# Patient Record
Sex: MALE | Race: WHITE | NOT HISPANIC OR LATINO | URBAN - METROPOLITAN AREA
[De-identification: names, ages, dates, MRNs, and addresses within clinical notes are randomized per-mention and may not be internally consistent; named-entity substitution may affect disease eponyms.]

---

## 2018-09-06 VITALS
DIASTOLIC BLOOD PRESSURE: 69 MMHG | DIASTOLIC BLOOD PRESSURE: 67 MMHG | DIASTOLIC BLOOD PRESSURE: 68 MMHG | SYSTOLIC BLOOD PRESSURE: 141 MMHG | RESPIRATION RATE: 19 BRPM | SYSTOLIC BLOOD PRESSURE: 112 MMHG | HEART RATE: 74 BPM | HEART RATE: 70 BPM | DIASTOLIC BLOOD PRESSURE: 76 MMHG | SYSTOLIC BLOOD PRESSURE: 137 MMHG | DIASTOLIC BLOOD PRESSURE: 74 MMHG | SYSTOLIC BLOOD PRESSURE: 119 MMHG | HEART RATE: 72 BPM | OXYGEN SATURATION: 95 % | OXYGEN SATURATION: 92 % | OXYGEN SATURATION: 96 % | HEART RATE: 92 BPM | RESPIRATION RATE: 27 BRPM | RESPIRATION RATE: 23 BRPM | WEIGHT: 228 LBS | OXYGEN SATURATION: 97 % | RESPIRATION RATE: 18 BRPM | RESPIRATION RATE: 22 BRPM | RESPIRATION RATE: 14 BRPM | SYSTOLIC BLOOD PRESSURE: 106 MMHG | OXYGEN SATURATION: 93 % | HEART RATE: 60 BPM | DIASTOLIC BLOOD PRESSURE: 60 MMHG | TEMPERATURE: 97.3 F | SYSTOLIC BLOOD PRESSURE: 109 MMHG | DIASTOLIC BLOOD PRESSURE: 66 MMHG | SYSTOLIC BLOOD PRESSURE: 140 MMHG | TEMPERATURE: 97.5 F | HEIGHT: 70 IN | HEART RATE: 79 BPM | SYSTOLIC BLOOD PRESSURE: 118 MMHG

## 2018-09-06 PROBLEM — Z86.010 PERSONAL HISTORY OF COLONIC POLYPS: Status: ACTIVE | Noted: 2018-09-07

## 2018-09-07 ENCOUNTER — OFFICE (AMBULATORY)
Dept: URBAN - METROPOLITAN AREA PATHOLOGY 4 | Facility: PATHOLOGY | Age: 63
End: 2018-09-07
Payer: COMMERCIAL

## 2018-09-07 ENCOUNTER — AMBULATORY SURGICAL CENTER (AMBULATORY)
Dept: URBAN - METROPOLITAN AREA SURGERY 17 | Facility: SURGERY | Age: 63
End: 2018-09-07

## 2018-09-07 DIAGNOSIS — D12.3 BENIGN NEOPLASM OF TRANSVERSE COLON: ICD-10-CM

## 2018-09-07 DIAGNOSIS — Z86.010 PERSONAL HISTORY OF COLONIC POLYPS: ICD-10-CM

## 2018-09-07 DIAGNOSIS — K63.5 POLYP OF COLON: ICD-10-CM

## 2018-09-07 LAB
GI HISTOLOGY: A. UNSPECIFIED: (no result)
GI HISTOLOGY: PDF REPORT: (no result)

## 2018-09-07 PROCEDURE — 88305 TISSUE EXAM BY PATHOLOGIST: CPT | Mod: 33 | Performed by: INTERNAL MEDICINE

## 2018-09-07 PROCEDURE — 45385 COLONOSCOPY W/LESION REMOVAL: CPT | Mod: 33 | Performed by: INTERNAL MEDICINE

## 2024-05-07 ENCOUNTER — TRANSCRIBE ORDERS (OUTPATIENT)
Age: 69
End: 2024-05-07

## 2024-05-07 DIAGNOSIS — I73.9 PERIPHERAL VASCULAR DISEASE: Primary | ICD-10-CM

## 2024-05-07 DIAGNOSIS — E11.65 TYPE 2 DIABETES MELLITUS WITH HYPERGLYCEMIA, UNSPECIFIED WHETHER LONG TERM INSULIN USE: ICD-10-CM

## 2024-06-27 ENCOUNTER — OFFICE VISIT (OUTPATIENT)
Age: 69
End: 2024-06-27
Payer: MEDICARE

## 2024-06-27 VITALS
HEIGHT: 70 IN | DIASTOLIC BLOOD PRESSURE: 77 MMHG | WEIGHT: 213.85 LBS | SYSTOLIC BLOOD PRESSURE: 149 MMHG | HEART RATE: 75 BPM | BODY MASS INDEX: 30.61 KG/M2

## 2024-06-27 DIAGNOSIS — E13.51 PERIPHERAL VASCULAR DISEASE DUE TO SECONDARY DIABETES: Primary | ICD-10-CM

## 2024-06-27 DIAGNOSIS — G62.9 NEUROPATHY: ICD-10-CM

## 2024-06-27 RX ORDER — ERGOCALCIFEROL 1.25 MG/1
1 CAPSULE ORAL WEEKLY
COMMUNITY
Start: 2024-06-13

## 2024-06-27 RX ORDER — EMPAGLIFLOZIN 25 MG/1
25 TABLET, FILM COATED ORAL EVERY MORNING
COMMUNITY
Start: 2024-06-13

## 2024-06-27 RX ORDER — PIOGLITAZONEHYDROCHLORIDE 30 MG/1
30 TABLET ORAL EVERY MORNING
COMMUNITY
Start: 2024-05-29

## 2024-06-27 RX ORDER — LEVOTHYROXINE SODIUM 0.05 MG/1
50 TABLET ORAL DAILY
COMMUNITY
Start: 2024-05-29

## 2024-06-27 RX ORDER — EZETIMIBE 10 MG/1
1 TABLET ORAL DAILY
COMMUNITY
Start: 2024-05-12

## 2024-06-27 RX ORDER — ROSUVASTATIN CALCIUM 40 MG/1
1 TABLET, COATED ORAL DAILY
COMMUNITY
Start: 2024-06-13

## 2024-06-27 NOTE — PROGRESS NOTES
Patient Name: Jono Montanez    MRN: [id number] Encounter Date: 06/27/2024      Consulting Service: Vascular Surgery    Referring Provider: Giovanna Fowler MD       CHIEF COMPLAINT:  Chief Complaint   Patient presents with    Peripheral Vascular Disease       Subjective    HPI: Jono Montanez is a 68 y.o. male is being seen for evaluation/management of painful numb feet.  At times he has had to wake up at night and stretches legs in a dorsiflexion manner to get his legs to release and and improved.  He states he can walk a decent distance and does not get pain that causes him to hang his foot over the side of the bed at night.  The bottom of his feet have gotten numb.  He saw some years ago and was tested at that time but is worried that is symptoms have progressed.  He has had a friend in [place] to get intervention recently for peripheral vascular disease.    PAST MEDICAL HISTORY: History reviewed. No pertinent past medical history.   PAST SURGICAL HISTORY: History reviewed. No pertinent surgical history.   FAMILY HISTORY: History reviewed. No pertinent family history.   SOCIAL HISTORY:     MEDICATIONS:   Current Outpatient Medications on File Prior to Visit   Medication Sig Dispense Refill    ezetimibe (ZETIA) 10 MG tablet Take 1 tablet by mouth Daily.      Jardiance 25 MG tablet tablet Take 1 tablet by mouth Every Morning.      levothyroxine (SYNTHROID, LEVOTHROID) 50 MCG tablet Take 1 tablet by mouth Daily.      pioglitazone (ACTOS) 30 MG tablet Take 1 tablet by mouth Every Morning.      rosuvastatin (CRESTOR) 40 MG tablet Take 1 tablet by mouth Daily.      vitamin D (ERGOCALCIFEROL) 1.25 MG (06115 UT) capsule capsule Take 1 capsule by mouth 1 (One) Time Per Week.       No current facility-administered medications on file prior to visit.       ALLERGIES: Patient has no known allergies.       Objective   Vitals:    06/27/24 1051   BP: 149/77   Pulse: 75   Weight: 97 kg (213 lb 13.5 oz)   Height: 177.8  "cm (70\")     Body mass index is 30.68 kg/m².  BMI is >= 30 and <35. (Class 1 Obesity). The following options were offered after discussion;: weight loss educational material (shared in after visit summary), exercise counseling/recommendations, and Information on healthy weight added to patient's after visit summary.      PHYSICAL EXAM:   Physical Exam  Constitutional:       Appearance: Normal appearance. He is normal weight.   HENT:      Head: Normocephalic and atraumatic.      Nose: Nose normal.   Eyes:      Extraocular Movements: Extraocular movements intact.      Pupils: Pupils are equal, round, and reactive to light.   Cardiovascular:      Rate and Rhythm: Normal rate.      Pulses:           Carotid pulses are 2+ on the right side and 2+ on the left side.       Radial pulses are 2+ on the right side and 2+ on the left side.        Femoral pulses are 2+ on the right side and 2+ on the left side.       Popliteal pulses are 2+ on the right side and 2+ on the left side.        Dorsalis pedis pulses are 1+ on the right side and 1+ on the left side.        Posterior tibial pulses are 2+ on the right side and 2+ on the left side.      Heart sounds: Normal heart sounds.   Pulmonary:      Effort: Pulmonary effort is normal.      Breath sounds: Normal breath sounds.   Abdominal:      General: Abdomen is flat. Bowel sounds are normal.      Palpations: Abdomen is soft.   Musculoskeletal:         General: Normal range of motion.      Cervical back: Normal range of motion and neck supple.      Right lower leg: No edema.      Left lower leg: No edema.   Skin:     General: Skin is warm and dry.   Neurological:      General: No focal deficit present.      Mental Status: He is alert and oriented to person, place, and time. Mental status is at baseline.   Psychiatric:         Mood and Affect: Mood normal.         Thought Content: Thought content normal.          Result Review   LABS:      Results Review:       I reviewed the " patient's new clinical results.    The following radiologic or non-invasive studies have been reviewed by me: No studies available.  No radiology results for the last 30 days.                ASSESSMENT/PLAN:   Diagnoses and all orders for this visit:    1. Peripheral vascular disease due to secondary diabetes (Primary)  -     Doppler Arterial Lower Extremity Stress CAR; Future    2. Neuropathy  -     Doppler Arterial Lower Extremity Stress CAR; Future       68 y.o. male with possible peripheral vascular disease versus neuropathy.  It is concerning as far as his progression of symptoms.  I can still palpate +2 posterior tibial pulses but I think a lower extremity arterial testing with stress and digital pressures is key to us knowing exactly what his perfusion is doing.  Will get this test and go over the results with her directly and make a decision if there is anything that can or should be done.  The big concern is whether or not this is peripheral vascular disease or if this is another source of neuropathy such as his diabetes.  His diabetes is under reasonable control what his A1c is 8.5.  He does not have any back pain or complaints of problems that would lead me towards any sciatica.    I discussed the plan with the patient who is agreeable to the plan of care at this point. Thank you for this consult.   Follow Up  Return in about 6 weeks (around 8/8/2024).    Evgeny Blanton MD   06/27/24

## 2024-07-10 ENCOUNTER — HOSPITAL ENCOUNTER (OUTPATIENT)
Dept: CARDIOLOGY | Facility: HOSPITAL | Age: 69
Discharge: HOME | End: 2024-07-10
Admitting: SURGERY
Payer: MEDICARE

## 2024-07-10 DIAGNOSIS — E13.51 PERIPHERAL VASCULAR DISEASE DUE TO SECONDARY DIABETES: ICD-10-CM

## 2024-07-10 DIAGNOSIS — G62.9 NEUROPATHY: ICD-10-CM

## 2024-07-10 LAB
BH CV LOWER ARTERIAL LEFT ABI RATIO: 1.21
BH CV LOWER ARTERIAL LEFT DORSALIS PEDIS SYS MAX: 151
BH CV LOWER ARTERIAL LEFT GREAT TOE SYS MAX: 92
BH CV LOWER ARTERIAL LEFT POST EX ABI RATIO: 1.31
BH CV LOWER ARTERIAL LEFT POST TIBIAL SYS MAX: 170
BH CV LOWER ARTERIAL LEFT TBI RATIO: 0.65
BH CV LOWER ARTERIAL RIGHT ABI RATIO: 1.2
BH CV LOWER ARTERIAL RIGHT DORSALIS PEDIS SYS MAX: 150
BH CV LOWER ARTERIAL RIGHT GREAT TOE SYS MAX: 110
BH CV LOWER ARTERIAL RIGHT POST EX ABI RATIO: 1.28
BH CV LOWER ARTERIAL RIGHT POST TIBIAL SYS MAX: 169
BH CV LOWER ARTERIAL RIGHT TBI RATIO: 0.78
UPPER ARTERIAL LEFT ARM BRACHIAL SYS MAX: 141
UPPER ARTERIAL RIGHT ARM BRACHIAL SYS MAX: 134

## 2024-07-10 PROCEDURE — 93924 LWR XTR VASC STDY BILAT: CPT

## 2024-07-18 ENCOUNTER — OFFICE VISIT (OUTPATIENT)
Age: 69
End: 2024-07-18
Payer: MEDICARE

## 2024-07-18 VITALS
DIASTOLIC BLOOD PRESSURE: 79 MMHG | WEIGHT: 213 LBS | HEART RATE: 74 BPM | BODY MASS INDEX: 30.49 KG/M2 | SYSTOLIC BLOOD PRESSURE: 123 MMHG | HEIGHT: 70 IN

## 2024-07-18 DIAGNOSIS — G62.9 NEUROPATHY: ICD-10-CM

## 2024-07-18 DIAGNOSIS — E13.51 PERIPHERAL VASCULAR DISEASE DUE TO SECONDARY DIABETES: Primary | ICD-10-CM

## 2024-07-18 NOTE — PROGRESS NOTES
Patient Name: Jono Montanez    MRN: [id number] Encounter Date: 06/27/2024      Consulting Service: Vascular Surgery    Referring Provider: No ref. provider found       CHIEF COMPLAINT:  Chief Complaint   Patient presents with    Peripheral Vascular Disease       Subjective    HPI: Jono Montanez is a 68 y.o. male is being seen for evaluation/management of painful numb feet.  At times he has had to wake up at night and stretches legs in a dorsiflexion manner to get his legs to release and and improved.  He states he can walk a decent distance and does not get pain that causes him to hang his foot over the side of the bed at night.  The bottom of his feet have gotten numb.  He saw some years ago and was tested at that time but is worried that is symptoms have progressed.  He has had a friend in [place] to get intervention recently for peripheral vascular disease.    Today we are reviewing his lower extremity arterial testing which shows no drop in pressure with ambulation.  This is negative for claudication on either side.  His lower extremities have normal triphasic waveforms on the left and on the right with toe pressures being more than adequate to heal.  Essentially this looks to be a overall normal test    PAST MEDICAL HISTORY: History reviewed. No pertinent past medical history.   PAST SURGICAL HISTORY: History reviewed. No pertinent surgical history.   FAMILY HISTORY: History reviewed. No pertinent family history.   SOCIAL HISTORY:   Social History     Tobacco Use    Smoking status: Never      MEDICATIONS:   Current Outpatient Medications on File Prior to Visit   Medication Sig Dispense Refill    ezetimibe (ZETIA) 10 MG tablet Take 1 tablet by mouth Daily.      Jardiance 25 MG tablet tablet Take 1 tablet by mouth Every Morning.      levothyroxine (SYNTHROID, LEVOTHROID) 50 MCG tablet Take 1 tablet by mouth Daily.      pioglitazone (ACTOS) 30 MG tablet Take 1 tablet by mouth Every Morning.       "rosuvastatin (CRESTOR) 40 MG tablet Take 1 tablet by mouth Daily.      vitamin D (ERGOCALCIFEROL) 1.25 MG (95017 UT) capsule capsule Take 1 capsule by mouth 1 (One) Time Per Week.       No current facility-administered medications on file prior to visit.       ALLERGIES: Patient has no known allergies.       Objective   Vitals:    07/18/24 1144   BP: 123/79   Pulse: 74   Weight: 96.6 kg (213 lb)   Height: 177.8 cm (70\")     Body mass index is 30.56 kg/m².  BMI is >= 30 and <35. (Class 1 Obesity). The following options were offered after discussion;: weight loss educational material (shared in after visit summary), exercise counseling/recommendations, and Information on healthy weight added to patient's after visit summary.      PHYSICAL EXAM:   Physical Exam  Constitutional:       Appearance: Normal appearance. He is normal weight.   HENT:      Head: Normocephalic and atraumatic.      Nose: Nose normal.   Eyes:      Extraocular Movements: Extraocular movements intact.      Pupils: Pupils are equal, round, and reactive to light.   Cardiovascular:      Rate and Rhythm: Normal rate.      Pulses:           Carotid pulses are 2+ on the right side and 2+ on the left side.       Radial pulses are 2+ on the right side and 2+ on the left side.        Femoral pulses are 2+ on the right side and 2+ on the left side.       Popliteal pulses are 2+ on the right side and 2+ on the left side.        Dorsalis pedis pulses are 1+ on the right side and 1+ on the left side.        Posterior tibial pulses are 2+ on the right side and 2+ on the left side.      Heart sounds: Normal heart sounds.   Pulmonary:      Effort: Pulmonary effort is normal.      Breath sounds: Normal breath sounds.   Abdominal:      General: Abdomen is flat. Bowel sounds are normal.      Palpations: Abdomen is soft.   Musculoskeletal:         General: Normal range of motion.      Cervical back: Normal range of motion and neck supple.      Right lower leg: No " edema.      Left lower leg: No edema.   Skin:     General: Skin is warm and dry.   Neurological:      General: No focal deficit present.      Mental Status: He is alert and oriented to person, place, and time. Mental status is at baseline.   Psychiatric:         Mood and Affect: Mood normal.         Thought Content: Thought content normal.          Result Review   LABS:      Results Review:       I reviewed the patient's new clinical results.    The following radiologic or non-invasive studies have been reviewed by me: Arterial testing reviewed with images reviewed with the patient  No radiology results for the last 30 days.                ASSESSMENT/PLAN:   Diagnoses and all orders for this visit:    1. Peripheral vascular disease due to secondary diabetes (Primary)  -     Doppler Ankle Brachial Index Single Level CAR; Future    2. Neuropathy       68 y.o. male with possible peripheral vascular disease versus neuropathy.  It is concerning as far as his progression of symptoms.  At this point in time I believe the majority of symptoms are neuropathic in nature as his arterial testing is within normal limits.  We had a long discussion about protecting his feet especially as his neuropathy progresses towards numbness.  He is going to be aggressive with this and continue to wear his white socks and monitor his feet.  I am going to follow him up in 1 year to recheck his feet just for foot exam and make sure things remain stable on his diabetes.  Wish I had a way to fix his neuropathy but that is it is it is probably either idiopathic or related to his diabetes.  He is aware to keep his sugars under strict control and watch his feet.  We will recheck his NOEMI in a year  I discussed the plan with the patient who is agreeable to the plan of care at this point. Thank you for this consult.   Follow Up  Return in about 1 year (around 7/18/2025).    Evgeny Blanton MD   07/18/24

## 2025-02-03 DIAGNOSIS — E13.51 PERIPHERAL VASCULAR DISEASE DUE TO SECONDARY DIABETES: Primary | ICD-10-CM

## 2025-03-14 ENCOUNTER — TELEPHONE (OUTPATIENT)
Facility: HOSPITAL | Age: 70
End: 2025-03-14
Payer: MEDICARE

## 2025-03-14 NOTE — TELEPHONE ENCOUNTER
Called and left voicemail for patient to call our office back to schedule 1 YR follow up with NOEMI for July 2025

## 2025-04-08 ENCOUNTER — OFFICE VISIT (OUTPATIENT)
Age: 70
End: 2025-04-08
Payer: MEDICARE

## 2025-04-08 VITALS
DIASTOLIC BLOOD PRESSURE: 70 MMHG | BODY MASS INDEX: 30.64 KG/M2 | WEIGHT: 214 LBS | SYSTOLIC BLOOD PRESSURE: 130 MMHG | HEIGHT: 70 IN | HEART RATE: 78 BPM

## 2025-04-08 DIAGNOSIS — R06.09 EXERTIONAL DYSPNEA: Primary | ICD-10-CM

## 2025-04-08 PROCEDURE — 1160F RVW MEDS BY RX/DR IN RCRD: CPT | Performed by: INTERNAL MEDICINE

## 2025-04-08 PROCEDURE — 93000 ELECTROCARDIOGRAM COMPLETE: CPT | Performed by: INTERNAL MEDICINE

## 2025-04-08 PROCEDURE — 1159F MED LIST DOCD IN RCRD: CPT | Performed by: INTERNAL MEDICINE

## 2025-04-08 PROCEDURE — 99204 OFFICE O/P NEW MOD 45 MIN: CPT | Performed by: INTERNAL MEDICINE

## 2025-04-08 RX ORDER — NATEGLINIDE 120 MG/1
120 TABLET ORAL
COMMUNITY
Start: 2025-03-21

## 2025-04-08 RX ORDER — ASPIRIN 81 MG/1
81 TABLET ORAL DAILY
COMMUNITY

## 2025-04-08 NOTE — PROGRESS NOTES
Subjective:     Encounter Date:04/08/2025      Patient ID: Jono Montanez is a 69 y.o. male.    Chief Complaint: dyspnea  HPI:   This is a pleasant 69-year-old man with a history of hypertension diabetes hyperlipidemia.  He recently noticed reduced exercise tolerance.  He recently traveled to his home country of [place] and had a lot of dietary intolerances and was not taking his medications regularly.  His A1c went from well-controlled to about 10%.  He has felt quite sluggish she has had heaviness in the chest and some mild dyspnea not necessarily with his regular 3 mile walk but when he is rushing more briskly or jogging.  He has no real angina orthopnea or PND.  He has never been told he has a heart murmur.  No palpitations or dizziness.  Is been evaluated by pulmonology apparently with no findings.  Overall he feels quite fatigued and feels he could sleep and unlimited number of hours per day.  No known family history.  He is a retired .  He has an adult son.    The following portions of the patient's history were reviewed and updated as appropriate: allergies, current medications, past family history, past medical history, past social history, past surgical history and problem list.     REVIEW OF SYSTEMS:   All systems reviewed.  Pertinent positives identified in HPI.  All other systems are negative.    No past medical history on file.    Family History   Problem Relation Age of Onset    No Known Problems Mother     Cerebral aneurysm Father 79    Cerebral aneurysm Brother 26       Social History     Socioeconomic History    Marital status: Unknown   Tobacco Use    Smoking status: Never     Passive exposure: Never    Smokeless tobacco: Never   Vaping Use    Vaping status: Never Used   Substance and Sexual Activity    Alcohol use: Not Currently    Drug use: Defer    Sexual activity: Defer       No Known Allergies    No past surgical history on file.      ECG 12 Lead    Date/Time: 4/8/2025 4:36  PM  Performed by: Ella Herndon MD    Authorized by: Ella Herndon MD  Comparison: not compared with previous ECG   Rhythm: sinus rhythm  Rate: normal  T flattening: V6, V5, V4, II, III, aVF and aVL  QRS axis: normal  Other findings: non-specific ST-T wave changes    Clinical impression: abnormal EKG           Objective:         PHYSICAL EXAM:  GEN: VSS, no distress,   Eyes: normal sclera, normal lids and lashes  HENT: moist mucus membranes,   Respiratory: CTAB, no rales or wheezes  CV: RRR, 2 out of 6 radiating systolic murmur to the clavicles , , +2 DP and 2+ carotid pulses b/l  GI: NABS, soft,  Nontender, nondistended  MSK: no edema, no scoliosis or kyphosis  Skin: no rash, warm, dry  Heme/Lymph: no bruising or bleeding  Psych: organized thought, normal behavior and affect  Neuro: Cranial nerves grossly intact, Alert and Oriented x 3.         Assessment:          Diagnosis Plan   1. Exertional dyspnea  Adult Transthoracic Echo Complete w/ Color, Spectral and Contrast if Necessary Per Protocol    Stress Test With Myocardial Perfusion One Day             Plan:       1.  Exertional dyspnea: Plan nuclear stress test.  69-year-old man with hypertension, diabetes and hyperlipidemia.  Resting EKG was reportedly normal in the past however his EKG today shows nonspecific T wave flattening inferior and lateral leads as such a treadmill stress test will not be helpful.  2.  Murmur aortic sclerosis or mild AS: Plan echocardiogram    Dr. Ocampo thank you very much for referring this kind patient to me. Please call me with any questions or concerns. I will see the patient again in the office in 3 months.          Ella Herndon MD  04/08/25  [facility]    Outpatient Encounter Medications as of 4/8/2025   Medication Sig Dispense Refill    aspirin 81 MG EC tablet Take 1 tablet by mouth Daily.      ezetimibe (ZETIA) 10 MG tablet Take 1 tablet by mouth Daily.      Jardiance 25 MG tablet tablet Take 1 tablet by  mouth Every Morning.      levothyroxine (SYNTHROID, LEVOTHROID) 50 MCG tablet Take 1 tablet by mouth Daily.      nateglinide (STARLIX) 120 MG tablet Take 1 tablet by mouth 3 (Three) Times a Day Before Meals.      rosuvastatin (CRESTOR) 40 MG tablet Take 1 tablet by mouth Daily.      [DISCONTINUED] pioglitazone (ACTOS) 30 MG tablet Take 1 tablet by mouth Every Morning.      [DISCONTINUED] vitamin D (ERGOCALCIFEROL) 1.25 MG (32842 UT) capsule capsule Take 1 capsule by mouth 1 (One) Time Per Week.       No facility-administered encounter medications on file as of 4/8/2025.

## 2025-04-08 NOTE — H&P (VIEW-ONLY)
Subjective:     Encounter Date:04/08/2025      Patient ID: Jono Montanez is a 69 y.o. male.    Chief Complaint: dyspnea  HPI:   This is a pleasant 69-year-old man with a history of hypertension diabetes hyperlipidemia.  He recently noticed reduced exercise tolerance.  He recently traveled to his home country of [place] and had a lot of dietary intolerances and was not taking his medications regularly.  His A1c went from well-controlled to about 10%.  He has felt quite sluggish she has had heaviness in the chest and some mild dyspnea not necessarily with his regular 3 mile walk but when he is rushing more briskly or jogging.  He has no real angina orthopnea or PND.  He has never been told he has a heart murmur.  No palpitations or dizziness.  Is been evaluated by pulmonology apparently with no findings.  Overall he feels quite fatigued and feels he could sleep and unlimited number of hours per day.  No known family history.  He is a retired .  He has an adult son.    The following portions of the patient's history were reviewed and updated as appropriate: allergies, current medications, past family history, past medical history, past social history, past surgical history and problem list.     REVIEW OF SYSTEMS:   All systems reviewed.  Pertinent positives identified in HPI.  All other systems are negative.    No past medical history on file.    Family History   Problem Relation Age of Onset    No Known Problems Mother     Cerebral aneurysm Father 79    Cerebral aneurysm Brother 26       Social History     Socioeconomic History    Marital status: Unknown   Tobacco Use    Smoking status: Never     Passive exposure: Never    Smokeless tobacco: Never   Vaping Use    Vaping status: Never Used   Substance and Sexual Activity    Alcohol use: Not Currently    Drug use: Defer    Sexual activity: Defer       No Known Allergies    No past surgical history on file.      ECG 12 Lead    Date/Time: 4/8/2025 4:36  PM  Performed by: Ella Herndon MD    Authorized by: Ella Herndon MD  Comparison: not compared with previous ECG   Rhythm: sinus rhythm  Rate: normal  T flattening: V6, V5, V4, II, III, aVF and aVL  QRS axis: normal  Other findings: non-specific ST-T wave changes    Clinical impression: abnormal EKG           Objective:         PHYSICAL EXAM:  GEN: VSS, no distress,   Eyes: normal sclera, normal lids and lashes  HENT: moist mucus membranes,   Respiratory: CTAB, no rales or wheezes  CV: RRR, 2 out of 6 radiating systolic murmur to the clavicles , , +2 DP and 2+ carotid pulses b/l  GI: NABS, soft,  Nontender, nondistended  MSK: no edema, no scoliosis or kyphosis  Skin: no rash, warm, dry  Heme/Lymph: no bruising or bleeding  Psych: organized thought, normal behavior and affect  Neuro: Cranial nerves grossly intact, Alert and Oriented x 3.         Assessment:          Diagnosis Plan   1. Exertional dyspnea  Adult Transthoracic Echo Complete w/ Color, Spectral and Contrast if Necessary Per Protocol    Stress Test With Myocardial Perfusion One Day             Plan:       1.  Exertional dyspnea: Plan nuclear stress test.  69-year-old man with hypertension, diabetes and hyperlipidemia.  Resting EKG was reportedly normal in the past however his EKG today shows nonspecific T wave flattening inferior and lateral leads as such a treadmill stress test will not be helpful.  2.  Murmur aortic sclerosis or mild AS: Plan echocardiogram    Dr. Ocampo thank you very much for referring this kind patient to me. Please call me with any questions or concerns. I will see the patient again in the office in 3 months.          Ella Herndon MD  04/08/25  [facility]    Outpatient Encounter Medications as of 4/8/2025   Medication Sig Dispense Refill    aspirin 81 MG EC tablet Take 1 tablet by mouth Daily.      ezetimibe (ZETIA) 10 MG tablet Take 1 tablet by mouth Daily.      Jardiance 25 MG tablet tablet Take 1 tablet by  mouth Every Morning.      levothyroxine (SYNTHROID, LEVOTHROID) 50 MCG tablet Take 1 tablet by mouth Daily.      nateglinide (STARLIX) 120 MG tablet Take 1 tablet by mouth 3 (Three) Times a Day Before Meals.      rosuvastatin (CRESTOR) 40 MG tablet Take 1 tablet by mouth Daily.      [DISCONTINUED] pioglitazone (ACTOS) 30 MG tablet Take 1 tablet by mouth Every Morning.      [DISCONTINUED] vitamin D (ERGOCALCIFEROL) 1.25 MG (31081 UT) capsule capsule Take 1 capsule by mouth 1 (One) Time Per Week.       No facility-administered encounter medications on file as of 4/8/2025.

## 2025-04-23 ENCOUNTER — TELEPHONE (OUTPATIENT)
Dept: CARDIOLOGY | Age: 70
End: 2025-04-23
Payer: MEDICARE

## 2025-04-24 ENCOUNTER — HOSPITAL ENCOUNTER (OUTPATIENT)
Dept: CARDIOLOGY | Facility: HOSPITAL | Age: 70
Discharge: HOME | End: 2025-04-24
Payer: MEDICARE

## 2025-04-24 VITALS — WEIGHT: 214.07 LBS | BODY MASS INDEX: 30.65 KG/M2 | HEIGHT: 70 IN

## 2025-04-24 VITALS
SYSTOLIC BLOOD PRESSURE: 120 MMHG | HEIGHT: 70 IN | DIASTOLIC BLOOD PRESSURE: 70 MMHG | BODY MASS INDEX: 30.64 KG/M2 | HEART RATE: 70 BPM | WEIGHT: 214 LBS

## 2025-04-24 DIAGNOSIS — R06.09 EXERTIONAL DYSPNEA: ICD-10-CM

## 2025-04-24 LAB
AORTIC DIMENSIONLESS INDEX: 0.49 (DI)
ASCENDING AORTA: 4 CM
AV MEAN PRESS GRAD SYS DOP V1V2: 8.1 MMHG
AV VMAX SYS DOP: 181 CM/SEC
BH CV ECHO MEAS - ACS: 1.65 CM
BH CV ECHO MEAS - AO MAX PG: 13.1 MMHG
BH CV ECHO MEAS - AO ROOT AREA (BSA CORRECTED): 2 CM2
BH CV ECHO MEAS - AO V2 VTI: 44.8 CM
BH CV ECHO MEAS - AVA(I,D): 1.56 CM2
BH CV ECHO MEAS - EDV(CUBED): 110.6 ML
BH CV ECHO MEAS - EDV(MOD-SP2): 113 ML
BH CV ECHO MEAS - EDV(MOD-SP4): 87 ML
BH CV ECHO MEAS - EF(MOD-SP2): 62.8 %
BH CV ECHO MEAS - EF(MOD-SP4): 52.9 %
BH CV ECHO MEAS - ESV(CUBED): 35.4 ML
BH CV ECHO MEAS - ESV(MOD-SP2): 42 ML
BH CV ECHO MEAS - ESV(MOD-SP4): 41 ML
BH CV ECHO MEAS - FS: 31.6 %
BH CV ECHO MEAS - IVS/LVPW: 1.11 CM
BH CV ECHO MEAS - IVSD: 1 CM
BH CV ECHO MEAS - LAT PEAK E' VEL: 7.4 CM/SEC
BH CV ECHO MEAS - LV DIASTOLIC VOL/BSA (35-75): 40.5 CM2
BH CV ECHO MEAS - LV MASS(C)D: 158.8 GRAMS
BH CV ECHO MEAS - LV MAX PG: 3.3 MMHG
BH CV ECHO MEAS - LV MEAN PG: 1.75 MMHG
BH CV ECHO MEAS - LV SYSTOLIC VOL/BSA (12-30): 19.1 CM2
BH CV ECHO MEAS - LV V1 MAX: 90.3 CM/SEC
BH CV ECHO MEAS - LV V1 VTI: 22.1 CM
BH CV ECHO MEAS - LVIDD: 4.8 CM
BH CV ECHO MEAS - LVIDS: 3.3 CM
BH CV ECHO MEAS - LVOT AREA: 3.2 CM2
BH CV ECHO MEAS - LVOT DIAM: 2 CM
BH CV ECHO MEAS - LVPWD: 0.9 CM
BH CV ECHO MEAS - MED PEAK E' VEL: 6.6 CM/SEC
BH CV ECHO MEAS - MR MAX PG: 12.8 MMHG
BH CV ECHO MEAS - MR MAX VEL: 179.2 CM/SEC
BH CV ECHO MEAS - MV A DUR: 0.17 SEC
BH CV ECHO MEAS - MV A MAX VEL: 89.7 CM/SEC
BH CV ECHO MEAS - MV DEC SLOPE: 452.1 CM/SEC2
BH CV ECHO MEAS - MV DEC TIME: 0.18 SEC
BH CV ECHO MEAS - MV E MAX VEL: 87.5 CM/SEC
BH CV ECHO MEAS - MV E/A: 0.98
BH CV ECHO MEAS - MV MAX PG: 3.6 MMHG
BH CV ECHO MEAS - MV MEAN PG: 1.56 MMHG
BH CV ECHO MEAS - MV P1/2T: 60.1 MSEC
BH CV ECHO MEAS - MV V2 VTI: 27 CM
BH CV ECHO MEAS - MVA(P1/2T): 3.7 CM2
BH CV ECHO MEAS - MVA(VTI): 2.6 CM2
BH CV ECHO MEAS - PA V2 MAX: 101.8 CM/SEC
BH CV ECHO MEAS - PULM A REVS DUR: 0.11 SEC
BH CV ECHO MEAS - PULM A REVS VEL: 24.8 CM/SEC
BH CV ECHO MEAS - PULM DIAS VEL: 23.5 CM/SEC
BH CV ECHO MEAS - PULM S/D: 1.12
BH CV ECHO MEAS - PULM SYS VEL: 26.2 CM/SEC
BH CV ECHO MEAS - QP/QS: 0.77
BH CV ECHO MEAS - RAP SYSTOLE: 3 MMHG
BH CV ECHO MEAS - RV MAX PG: 2.45 MMHG
BH CV ECHO MEAS - RV V1 MAX: 78.2 CM/SEC
BH CV ECHO MEAS - RV V1 VTI: 16.8 CM
BH CV ECHO MEAS - RVOT DIAM: 2.02 CM
BH CV ECHO MEAS - RVSP: 17 MMHG
BH CV ECHO MEAS - SV(LVOT): 69.8 ML
BH CV ECHO MEAS - SV(MOD-SP2): 71 ML
BH CV ECHO MEAS - SV(MOD-SP4): 46 ML
BH CV ECHO MEAS - SV(RVOT): 53.8 ML
BH CV ECHO MEAS - SVI(LVOT): 32.5 ML/M2
BH CV ECHO MEAS - SVI(MOD-SP2): 33.1 ML/M2
BH CV ECHO MEAS - SVI(MOD-SP4): 21.4 ML/M2
BH CV ECHO MEAS - TAPSE (>1.6): 1.8 CM
BH CV ECHO MEAS - TR MAX PG: 13.6 MMHG
BH CV ECHO MEAS - TR MAX VEL: 184.2 CM/SEC
BH CV ECHO MEASUREMENTS AVERAGE E/E' RATIO: 12.5
BH CV NUCLEAR PRIOR STUDY: 2
BH CV REST NUCLEAR ISOTOPE DOSE: 10.8 MCI
BH CV STRESS BP STAGE 1: NORMAL
BH CV STRESS BP STAGE 2: NORMAL
BH CV STRESS DURATION MIN STAGE 1: 3
BH CV STRESS DURATION MIN STAGE 2: 3
BH CV STRESS DURATION SEC STAGE 1: 0
BH CV STRESS DURATION SEC STAGE 2: 0
BH CV STRESS GRADE STAGE 1: 10
BH CV STRESS GRADE STAGE 2: 12
BH CV STRESS HR STAGE 1: 105
BH CV STRESS HR STAGE 2: 129
BH CV STRESS METS STAGE 1: 5
BH CV STRESS METS STAGE 2: 7.5
BH CV STRESS NUCLEAR ISOTOPE DOSE: 33.3 MCI
BH CV STRESS PROTOCOL 1: NORMAL
BH CV STRESS RECOVERY BP: NORMAL MMHG
BH CV STRESS RECOVERY HR: 80 BPM
BH CV STRESS SPEED STAGE 1: 1.7
BH CV STRESS SPEED STAGE 2: 2.5
BH CV STRESS STAGE 1: 1
BH CV STRESS STAGE 2: 2
BH CV XLRA - RV BASE: 3.3 CM
BH CV XLRA - RV LENGTH: 7.1 CM
BH CV XLRA - RV MID: 2.7 CM
BH CV XLRA - TDI S': 8.6 CM/SEC
LEFT ATRIUM VOLUME INDEX: 22.4 ML/M2
LV EF BIPLANE MOD: 57.5 %
MAXIMAL PREDICTED HEART RATE: 151 BPM
PERCENT MAX PREDICTED HR: 85.43 %
SINUS: 4.1 CM
SPECT HRT GATED+EF W RNC IV: 67 %
STJ: 3.7 CM
STRESS BASELINE BP: NORMAL MMHG
STRESS BASELINE HR: 79 BPM
STRESS PERCENT HR: 101 %
STRESS POST ESTIMATED WORKLOAD: 7.5 METS
STRESS POST EXERCISE DUR MIN: 6 MIN
STRESS POST EXERCISE DUR SEC: 0 SEC
STRESS POST PEAK BP: NORMAL MMHG
STRESS POST PEAK HR: 129 BPM
STRESS TARGET HR: 128 BPM

## 2025-04-24 PROCEDURE — 93017 CV STRESS TEST TRACING ONLY: CPT

## 2025-04-24 PROCEDURE — A9502 TC99M TETROFOSMIN: HCPCS | Performed by: INTERNAL MEDICINE

## 2025-04-24 PROCEDURE — 93306 TTE W/DOPPLER COMPLETE: CPT

## 2025-04-24 PROCEDURE — 78452 HT MUSCLE IMAGE SPECT MULT: CPT

## 2025-04-24 PROCEDURE — 34310000005 TECHNETIUM TETROFOSMIN KIT: Performed by: INTERNAL MEDICINE

## 2025-04-24 RX ADMIN — TETROFOSMIN 1 DOSE: 1.38 INJECTION, POWDER, LYOPHILIZED, FOR SOLUTION INTRAVENOUS at 12:57

## 2025-04-24 RX ADMIN — TETROFOSMIN 1 DOSE: 1.38 INJECTION, POWDER, LYOPHILIZED, FOR SOLUTION INTRAVENOUS at 12:04

## 2025-04-28 DIAGNOSIS — I25.118 CORONARY ARTERY DISEASE WITH STABLE ANGINA PECTORIS, UNSPECIFIED VESSEL OR LESION TYPE, UNSPECIFIED WHETHER NATIVE OR TRANSPLANTED HEART: Primary | ICD-10-CM

## 2025-04-30 ENCOUNTER — TELEPHONE (OUTPATIENT)
Dept: CARDIOLOGY | Age: 70
End: 2025-04-30
Payer: MEDICARE

## 2025-04-30 DIAGNOSIS — I25.118 CORONARY ARTERY DISEASE WITH STABLE ANGINA PECTORIS, UNSPECIFIED VESSEL OR LESION TYPE, UNSPECIFIED WHETHER NATIVE OR TRANSPLANTED HEART: Primary | ICD-10-CM

## 2025-05-05 ENCOUNTER — LAB (OUTPATIENT)
Facility: HOSPITAL | Age: 70
End: 2025-05-05
Payer: MEDICARE

## 2025-05-05 DIAGNOSIS — I25.118 CORONARY ARTERY DISEASE WITH STABLE ANGINA PECTORIS, UNSPECIFIED VESSEL OR LESION TYPE, UNSPECIFIED WHETHER NATIVE OR TRANSPLANTED HEART: ICD-10-CM

## 2025-05-05 LAB
ANION GAP SERPL CALCULATED.3IONS-SCNC: 13 MMOL/L (ref 5–15)
BUN SERPL-MCNC: 10 MG/DL (ref 8–23)
BUN/CREAT SERPL: 10.3 (ref 7–25)
CALCIUM SPEC-SCNC: 9.4 MG/DL (ref 8.6–10.5)
CHLORIDE SERPL-SCNC: 102 MMOL/L (ref 98–107)
CO2 SERPL-SCNC: 26 MMOL/L (ref 22–29)
CREAT SERPL-MCNC: 0.97 MG/DL (ref 0.76–1.27)
DEPRECATED RDW RBC AUTO: 42 FL (ref 37–54)
EGFRCR SERPLBLD CKD-EPI 2021: 84.5 ML/MIN/1.73
ERYTHROCYTE [DISTWIDTH] IN BLOOD BY AUTOMATED COUNT: 13 % (ref 12.3–15.4)
GLUCOSE SERPL-MCNC: 137 MG/DL (ref 65–99)
HCT VFR BLD AUTO: 43 % (ref 37.5–51)
HGB BLD-MCNC: 14.5 G/DL (ref 13–17.7)
MCH RBC QN AUTO: 30.1 PG (ref 26.6–33)
MCHC RBC AUTO-ENTMCNC: 33.7 G/DL (ref 31.5–35.7)
MCV RBC AUTO: 89.4 FL (ref 79–97)
PLATELET # BLD AUTO: 239 10*3/MM3 (ref 140–450)
PMV BLD AUTO: 10.5 FL (ref 6–12)
POTASSIUM SERPL-SCNC: 4.1 MMOL/L (ref 3.5–5.2)
RBC # BLD AUTO: 4.81 10*6/MM3 (ref 4.14–5.8)
SODIUM SERPL-SCNC: 141 MMOL/L (ref 136–145)
WBC NRBC COR # BLD AUTO: 5.11 10*3/MM3 (ref 3.4–10.8)

## 2025-05-05 PROCEDURE — 85027 COMPLETE CBC AUTOMATED: CPT

## 2025-05-05 PROCEDURE — 36415 COLL VENOUS BLD VENIPUNCTURE: CPT

## 2025-05-05 PROCEDURE — 80048 BASIC METABOLIC PNL TOTAL CA: CPT

## 2025-05-08 ENCOUNTER — HOSPITAL ENCOUNTER (OUTPATIENT)
Facility: HOSPITAL | Age: 70
Setting detail: HOSPITAL OUTPATIENT SURGERY
Discharge: HOME | End: 2025-05-08
Attending: INTERNAL MEDICINE | Admitting: INTERNAL MEDICINE
Payer: MEDICARE

## 2025-05-08 VITALS
WEIGHT: 207 LBS | HEART RATE: 74 BPM | DIASTOLIC BLOOD PRESSURE: 93 MMHG | OXYGEN SATURATION: 97 % | TEMPERATURE: 96.6 F | BODY MASS INDEX: 29.63 KG/M2 | HEIGHT: 70 IN | RESPIRATION RATE: 16 BRPM | SYSTOLIC BLOOD PRESSURE: 144 MMHG

## 2025-05-08 DIAGNOSIS — I25.118 CORONARY ARTERY DISEASE OF NATIVE ARTERY OF NATIVE HEART WITH STABLE ANGINA PECTORIS: Primary | ICD-10-CM

## 2025-05-08 DIAGNOSIS — I25.118 CORONARY ARTERY DISEASE WITH STABLE ANGINA PECTORIS, UNSPECIFIED VESSEL OR LESION TYPE, UNSPECIFIED WHETHER NATIVE OR TRANSPLANTED HEART: ICD-10-CM

## 2025-05-08 LAB
GLUCOSE BLDC GLUCOMTR-MCNC: 157 MG/DL (ref 70–130)
QT INTERVAL: 411 MS
QTC INTERVAL: 413 MS

## 2025-05-08 PROCEDURE — C1769 GUIDE WIRE: HCPCS | Performed by: INTERNAL MEDICINE

## 2025-05-08 PROCEDURE — C9600 PERC DRUG-EL COR STENT SING: HCPCS | Performed by: INTERNAL MEDICINE

## 2025-05-08 PROCEDURE — 25810000003 SODIUM CHLORIDE 0.9 % SOLUTION: Performed by: INTERNAL MEDICINE

## 2025-05-08 PROCEDURE — C1874 STENT, COATED/COV W/DEL SYS: HCPCS | Performed by: INTERNAL MEDICINE

## 2025-05-08 PROCEDURE — 82948 REAGENT STRIP/BLOOD GLUCOSE: CPT

## 2025-05-08 PROCEDURE — 92928 PRQ TCAT PLMT NTRAC ST 1 LES: CPT | Performed by: INTERNAL MEDICINE

## 2025-05-08 PROCEDURE — 85347 COAGULATION TIME ACTIVATED: CPT

## 2025-05-08 PROCEDURE — 25010000002 LIDOCAINE 2% SOLUTION: Performed by: INTERNAL MEDICINE

## 2025-05-08 PROCEDURE — 25510000001 IOPAMIDOL PER 1 ML: Performed by: INTERNAL MEDICINE

## 2025-05-08 PROCEDURE — 25010000002 HEPARIN (PORCINE) PER 1000 UNITS: Performed by: INTERNAL MEDICINE

## 2025-05-08 PROCEDURE — C1725 CATH, TRANSLUMIN NON-LASER: HCPCS | Performed by: INTERNAL MEDICINE

## 2025-05-08 PROCEDURE — 25010000002 MIDAZOLAM PER 1 MG: Performed by: INTERNAL MEDICINE

## 2025-05-08 PROCEDURE — C1894 INTRO/SHEATH, NON-LASER: HCPCS | Performed by: INTERNAL MEDICINE

## 2025-05-08 PROCEDURE — 93454 CORONARY ARTERY ANGIO S&I: CPT | Performed by: INTERNAL MEDICINE

## 2025-05-08 PROCEDURE — C1753 CATH, INTRAVAS ULTRASOUND: HCPCS | Performed by: INTERNAL MEDICINE

## 2025-05-08 PROCEDURE — 25010000002 FENTANYL CITRATE (PF) 50 MCG/ML SOLUTION: Performed by: INTERNAL MEDICINE

## 2025-05-08 PROCEDURE — C1887 CATHETER, GUIDING: HCPCS | Performed by: INTERNAL MEDICINE

## 2025-05-08 PROCEDURE — 93010 ELECTROCARDIOGRAM REPORT: CPT | Performed by: INTERNAL MEDICINE

## 2025-05-08 PROCEDURE — 92978 ENDOLUMINL IVUS OCT C 1ST: CPT | Performed by: INTERNAL MEDICINE

## 2025-05-08 PROCEDURE — 93005 ELECTROCARDIOGRAM TRACING: CPT | Performed by: INTERNAL MEDICINE

## 2025-05-08 DEVICE — XIENCE SKYPOINT™ EVEROLIMUS ELUTING CORONARY STENT SYSTEM 3.50 MM X 23 MM / RAPID-EXCHANGE
Type: IMPLANTABLE DEVICE | Site: CORONARY | Status: FUNCTIONAL
Brand: XIENCE SKYPOINT™

## 2025-05-08 RX ORDER — NITROGLYCERIN 0.4 MG/1
0.4 TABLET SUBLINGUAL
Status: DISCONTINUED | OUTPATIENT
Start: 2025-05-08 | End: 2025-05-08 | Stop reason: HOSPADM

## 2025-05-08 RX ORDER — ACETAMINOPHEN 325 MG/1
650 TABLET ORAL EVERY 4 HOURS PRN
Status: DISCONTINUED | OUTPATIENT
Start: 2025-05-08 | End: 2025-05-08 | Stop reason: HOSPADM

## 2025-05-08 RX ORDER — SODIUM CHLORIDE 9 MG/ML
1 INJECTION, SOLUTION INTRAVENOUS CONTINUOUS
Status: ACTIVE | OUTPATIENT
Start: 2025-05-08 | End: 2025-05-08

## 2025-05-08 RX ORDER — SODIUM CHLORIDE 9 MG/ML
75 INJECTION, SOLUTION INTRAVENOUS CONTINUOUS
Status: ACTIVE | OUTPATIENT
Start: 2025-05-08 | End: 2025-05-08

## 2025-05-08 RX ORDER — CLOPIDOGREL BISULFATE 75 MG/1
75 TABLET ORAL DAILY
Qty: 90 TABLET | Refills: 3 | OUTPATIENT
Start: 2025-05-08

## 2025-05-08 RX ORDER — HEPARIN SODIUM 1000 [USP'U]/ML
INJECTION, SOLUTION INTRAVENOUS; SUBCUTANEOUS
Status: DISCONTINUED | OUTPATIENT
Start: 2025-05-08 | End: 2025-05-08 | Stop reason: HOSPADM

## 2025-05-08 RX ORDER — FENTANYL CITRATE 50 UG/ML
INJECTION, SOLUTION INTRAMUSCULAR; INTRAVENOUS
Status: DISCONTINUED | OUTPATIENT
Start: 2025-05-08 | End: 2025-05-08 | Stop reason: HOSPADM

## 2025-05-08 RX ORDER — ASPIRIN 81 MG/1
TABLET, CHEWABLE ORAL
Status: DISCONTINUED | OUTPATIENT
Start: 2025-05-08 | End: 2025-05-08 | Stop reason: HOSPADM

## 2025-05-08 RX ORDER — ASPIRIN 81 MG/1
81 TABLET ORAL DAILY
Status: DISCONTINUED | OUTPATIENT
Start: 2025-05-08 | End: 2025-05-08 | Stop reason: HOSPADM

## 2025-05-08 RX ORDER — LIDOCAINE HYDROCHLORIDE 20 MG/ML
INJECTION, SOLUTION INFILTRATION; PERINEURAL
Status: DISCONTINUED | OUTPATIENT
Start: 2025-05-08 | End: 2025-05-08 | Stop reason: HOSPADM

## 2025-05-08 RX ORDER — CLOPIDOGREL BISULFATE 75 MG/1
75 TABLET ORAL DAILY
Qty: 90 TABLET | Refills: 3 | Status: SHIPPED | OUTPATIENT
Start: 2025-05-08

## 2025-05-08 RX ORDER — CLOPIDOGREL BISULFATE 75 MG/1
75 TABLET ORAL DAILY
Status: DISCONTINUED | OUTPATIENT
Start: 2025-05-09 | End: 2025-05-08 | Stop reason: HOSPADM

## 2025-05-08 RX ORDER — MIDAZOLAM HYDROCHLORIDE 1 MG/ML
INJECTION, SOLUTION INTRAMUSCULAR; INTRAVENOUS
Status: DISCONTINUED | OUTPATIENT
Start: 2025-05-08 | End: 2025-05-08 | Stop reason: HOSPADM

## 2025-05-08 RX ORDER — VERAPAMIL HYDROCHLORIDE 2.5 MG/ML
INJECTION INTRAVENOUS
Status: DISCONTINUED | OUTPATIENT
Start: 2025-05-08 | End: 2025-05-08 | Stop reason: HOSPADM

## 2025-05-08 RX ORDER — IOPAMIDOL 755 MG/ML
INJECTION, SOLUTION INTRAVASCULAR
Status: DISCONTINUED | OUTPATIENT
Start: 2025-05-08 | End: 2025-05-08 | Stop reason: HOSPADM

## 2025-05-08 RX ORDER — CLOPIDOGREL BISULFATE 75 MG/1
TABLET ORAL
Status: DISCONTINUED | OUTPATIENT
Start: 2025-05-08 | End: 2025-05-08 | Stop reason: HOSPADM

## 2025-05-08 RX ADMIN — SODIUM CHLORIDE 1 ML/KG/HR: 9 INJECTION, SOLUTION INTRAVENOUS at 13:20

## 2025-05-08 RX ADMIN — SODIUM CHLORIDE 75 ML/HR: 9 INJECTION, SOLUTION INTRAVENOUS at 10:47

## 2025-05-08 NOTE — Clinical Note
First balloon inflation max pressure = 18 katie. First balloon inflation duration = 6 seconds. Second inflation of balloon - Max pressure = 18 katie. 2nd Inflation of balloon - Duration = 10 seconds. 2nd inflation was done at 12:40 EDT.

## 2025-05-08 NOTE — DISCHARGE INSTRUCTIONS
[facility]  [address] [place] [address]    Coronary Angioplasty with or without  Stent (Radial Approach) After Care    Refer to this sheet in the next few weeks. These instructions provide you with information on caring for yourself after your procedure. Your health care provider may also give you more specific instructions. Your treatment has been planned according to current medical practices, but problems sometimes occur. Call your health care provider if you have any problems or questions after your procedure.       Home Care Instructions:  You may shower the day after the procedure. Remove the bandage (dressing) and gently wash the site with plain soap and water. Gently pat the site dry. You may apply a band aid daily for 2 days if desired.    Do not apply powder or lotion to the site.  Do not submerge the affected site in water for 3 to 5 days or until the site is completely healed.   Do not flex or bend at the wrist with affected arm for 24 hours.  Do not lift, push or pull anything over 5 pounds for 5 days after your procedure or as directed by your physician.  For a reference, a gallon of milk weighs 8 pounds.    Do not operate machinery or power tools for 24 hours.  Inspect the site at least twice daily. You may notice some bruising at the site and it may be tender for 1 to 2 weeks.     Increase your fluid intake for the next 2 days.    Keep arm elevated for 24 hours.  For the remainder of the day, keep your arm in the “Pledge of Allegiance” position when up and about.    Limit your activity for the next 48 hours and avoid strenuous activity as directed by your physician.   Cardiac Rehab may or may not be ordered.  Please consult with your physician  You may drive 24 hours after the procedure unless otherwise instructed by your caregiver.  A responsible adult should be with you for the first 24 hours after you arrive home.   Do not make any important legal decisions or sign legal  papers for 24 hours. Do not drink alcohol for 24 hours.    Take medicines only as directed by your health care provider.  Blood thinners may be prescribed after your procedure to improve blood flow through the stent.    Metformin or any medications containing Metformin should not be taken for 48 hours after your procedure.    Eat a heart-healthy diet. This should include plenty of fresh fruits and vegetables. Meat should be lean cuts. Avoid the following types of food:    Food that is high in salt.    Canned or highly processed food.    Food that is high in saturated fat or sugar.    Fried food.    Make any other lifestyle changes recommended by your health care provider. This may include:    Not using any tobacco products including cigarettes, chewing tobacco, or electronic cigarettes.   Managing your weight.    Getting regular exercise.    Managing your blood pressure.    Limiting your alcohol intake.    Managing other health problems, such as diabetes.    If you need an MRI after your heart stent was placed, be sure to tell the health care provider who orders the MRI that you have a heart stent.    Keep all follow-up visits as directed by your health care provider.      Call Your Doctor If:    You have unusual pain at the radial/ulnar (wrist) site.  You have redness, warmth, swelling, or pain at the radial/ulnar (wrist) site.  You have drainage (other than a small amount of blood on the dressing).  `You have chills or a fever > 101.  Your arm becomes pale or dark, cool, tingly, or numb.  You develop chest pain, shortness of breath, feel faint or pass out.    You have heavy bleeding from the site.  If you do, hold pressure on the site for 20 minutes.  If the bleeding stops, apply a fresh bandage and call your cardiologist.  However, if you continue to have bleeding, maintain pressure and call 911.    You have any symptoms of a stroke.  Remember BE FAST  B-balance. Sudden trouble walking or loss of  balance.  E-eyes.  Sudden changes in how you see or a sudden onset of a very bad headache.   F-face. Sudden weakness or loss of feeling of the face or facial droop on one side.   A-arms Sudden weakness or numbness in one arm. One arm drifts down if they are both held out in front of you. This happens suddenly and usually on one side of the body.   S-speech.  Sudden trouble speaking, slurred speech or trouble understanding what people are saying.   T-time  Time to call emergency services.  Write down the symptoms and the time they started.

## 2025-05-08 NOTE — Clinical Note
Left anterior descending stent inserted. Unable to reach patient; voicemail left.      Cole Dunham MD  [facility] Nurse Msg Pool   Tick id- wood/dog tick.   Lyme titer negative

## 2025-05-12 LAB — ACT BLD: 308 SECONDS (ref 82–152)

## 2025-05-19 ENCOUNTER — TELEPHONE (OUTPATIENT)
Dept: CARDIAC REHAB | Facility: HOSPITAL | Age: 70
End: 2025-05-19
Payer: MEDICARE

## 2025-05-19 NOTE — TELEPHONE ENCOUNTER
Pt called to cancel 1st cardiac rehab appointment scheduled for Wednesday, May 21st at 9 am.  He said that he just has too much going on right now.  I did encourage him to call us back in the future if he would like to reschedule.      Thank you for allowing us to help care for your patients.

## 2025-05-30 ENCOUNTER — OFFICE VISIT (OUTPATIENT)
Age: 70
End: 2025-05-30
Payer: MEDICARE

## 2025-05-30 VITALS
WEIGHT: 217 LBS | HEART RATE: 67 BPM | DIASTOLIC BLOOD PRESSURE: 70 MMHG | SYSTOLIC BLOOD PRESSURE: 128 MMHG | HEIGHT: 70 IN | BODY MASS INDEX: 31.07 KG/M2

## 2025-05-30 DIAGNOSIS — I25.10 CORONARY ARTERY DISEASE INVOLVING NATIVE CORONARY ARTERY OF NATIVE HEART WITHOUT ANGINA PECTORIS: Primary | ICD-10-CM

## 2025-05-30 NOTE — PROGRESS NOTES
Subjective:     Encounter Date:05/30/2025      Patient ID: Jono Montanez is a 69 y.o. male.    Chief Complaint: CP  HPI:   68 yo man with CAD and DM. He was seen in April 2025 for new onset exertional dyspnea and chest heaviness. He underwent echo showing mild AS and AI with  mildly dilated aorta at 4.1cm. Nuclear stress was abnormal and cardiac catheterization subsequently showed severe single vessel CAD involving the proximal LAD status post PCI using 3.5x23mm Xience YARED.   He returns today. His angina has resolved, he is able to work in the yard without dyspnea. He does note ongoing fatigue which is likely multifactorial.     The following portions of the patient's history were reviewed and updated as appropriate: allergies, current medications, past family history, past medical history, past social history, past surgical history and problem list.     REVIEW OF SYSTEMS:   All systems reviewed.  Pertinent positives identified in HPI.  All other systems are negative.    Past Medical History:   Diagnosis Date    Diabetes mellitus     Disease of thyroid gland        Family History   Problem Relation Age of Onset    No Known Problems Mother     Cerebral aneurysm Father 79    Cerebral aneurysm Brother 26       Social History     Socioeconomic History    Marital status:    Tobacco Use    Smoking status: Never     Passive exposure: Never    Smokeless tobacco: Never   Vaping Use    Vaping status: Never Used   Substance and Sexual Activity    Alcohol use: Not Currently    Drug use: Defer    Sexual activity: Defer       No Known Allergies    Past Surgical History:   Procedure Laterality Date    CARDIAC CATHETERIZATION N/A 5/8/2025    Procedure: Coronary angiography, Left heart catheterization, Left ventricular angiography;  Surgeon: Ella Herndon MD;  Location: [facility] INVASIVE LOCATION;  Service: Cardiology;  Laterality: N/A;    CARDIAC CATHETERIZATION N/A 5/8/2025    Procedure: Percutaneous Coronary  Intervention;  Surgeon: Ella Herndon MD;  Location:  [facility] CATH INVASIVE LOCATION;  Service: Cardiology;  Laterality: N/A;    CARDIAC CATHETERIZATION N/A 5/8/2025    Procedure: Optical Coherence Tomography;  Surgeon: Ella Herndon MD;  Location:  [facility] CATH INVASIVE LOCATION;  Service: Cardiology;  Laterality: N/A;       Procedures       Objective:         PHYSICAL EXAM:  GEN: VSS, no distress,   Eyes: normal sclera, normal lids and lashes  HENT: moist mucus membranes,   Respiratory: CTAB, no rales or wheezes  CV: RRR, no murmurs, , +2 DP and 2+ carotid pulses b/l  GI: NABS, soft,  Nontender, nondistended  MSK: no edema, no scoliosis or kyphosis  Skin: no rash, warm, dry  Heme/Lymph: no bruising or bleeding  Psych: organized thought, normal behavior and affect  Neuro: Cranial nerves grossly intact, Alert and Oriented x 3.         Assessment:         No diagnosis found.       Plan:       CAD s/p proximal LAD PCI. DAPT x 6 months followed by plavix alone.   Mild AS  Mildly enlarged ascending aorta 4.1CM    Dr. Ocampo, thank you very much for referring this kind patient to me. Please call me with any questions or concerns. I will see the patient again in the office in 6 months.          Ella Herndon MD  05/30/25  [facility]    Outpatient Encounter Medications as of 5/30/2025   Medication Sig Dispense Refill    aspirin 81 MG EC tablet Take 1 tablet by mouth Daily.      clopidogrel (PLAVIX) 75 MG tablet Take 1 tablet by mouth Daily. 90 tablet 3    ezetimibe (ZETIA) 10 MG tablet Take 1 tablet by mouth Daily.      Jardiance 25 MG tablet tablet Take 1 tablet by mouth Every Morning.      levothyroxine (SYNTHROID, LEVOTHROID) 50 MCG tablet Take 1 tablet by mouth Daily.      nateglinide (STARLIX) 120 MG tablet Take 1 tablet by mouth 3 (Three) Times a Day Before Meals.      rosuvastatin (CRESTOR) 40 MG tablet Take 1 tablet by mouth Daily.       No facility-administered encounter medications on file as of  5/30/2025.

## 2025-06-16 ENCOUNTER — TELEPHONE (OUTPATIENT)
Dept: CARDIOLOGY | Facility: CLINIC | Age: 70
End: 2025-06-16
Payer: MEDICARE

## 2025-06-16 NOTE — TELEPHONE ENCOUNTER
Reviewed Dr. Herndon's message with Jono Montanez.  His questions were answered and he verbalized understanding of message and recommendations.    Thank you,  Talya AG RN  Triage Nurse KRISTINA  06/16/25 12:58 EDT

## 2025-06-16 NOTE — TELEPHONE ENCOUNTER
"Patient came up to the [facility] office to show his bruising on his upper arms that he believe is from taking his asa and blood thinner. He believes what he is experiencing is called \"Callie carrero\" wants Dr. Herndon to contact him about this, and go over the medication to see if he should still be on his medication.   "

## 2025-06-27 ENCOUNTER — TELEPHONE (OUTPATIENT)
Dept: CARDIOLOGY | Age: 70
End: 2025-06-27
Payer: MEDICARE

## 2025-06-27 NOTE — TELEPHONE ENCOUNTER
Pt called back. Gave current B/P readin/84, HR 87. Pt will continue to monitor.    Thank you,    Nicole Iniguez, RN  Triage [facility]  25 13:10 EDT

## 2025-06-27 NOTE — TELEPHONE ENCOUNTER
Reviewed recommendations with patient, verbalized understanding, will call with any further questions or complaints.  Pt states that he will monitor HR/BP, stay out of the heat, and if symptoms reoccur, go to ED as recommended.  Pt also states that he will check back in on Monday.    Hemalatha Angulo RN  Triage Nurse  06/27/25 12:37 EDT

## 2025-06-27 NOTE — TELEPHONE ENCOUNTER
"Pt called in to triage this morning.  Pt states that he had cardiac stent placed on 5/8/25.  Pt states that for the last 2-3 days, he has been doing some work outside around his house, he states that he felt like he fatigued quickly, and experienced a heart fluttering/pounding/shaking feeling.  He states that this relieved with rest.  This morning at around 4am, he states that he went for a walk outside, and \"I was not feeling very good in my chest\" and \"I just didn't feel comfortable\", again, this relieved with rest, and pt currently states that he feels fine and is laying down.  He states \"maybe it was indigestion?\", but states that he did not try anything for indigestion to see if it helped.  He also states that he may be having some KEYS, but was very vague about this symptom.  He denies any other related symptoms, and states that it was not like the shortness of air and chest heaviness that he experienced prior to heart cath/stent placement.  Pt states that he continues his meds including plavix and aspirin with no missed doses.  He has not checked HR/BP since this started.    I recommended that if symptoms returned/worsened, that pt call back in or be evaluated in ED.  Further recommendations?  There are no available NP appointments today or Monday at this time.    Thanks so much,  Hemalatha Angulo, RN  Triage RN  06/27/25 08:29 EDT    "

## 2025-06-27 NOTE — TELEPHONE ENCOUNTER
Please have him monitor his vitals over the weekend, avoid the heat, and if pain recurs go to the ER for an EKG

## 2025-06-30 RX ORDER — NEBIVOLOL 5 MG/1
5 TABLET ORAL DAILY
Qty: 30 TABLET | Refills: 11 | Status: SHIPPED | OUTPATIENT
Start: 2025-06-30

## 2025-06-30 NOTE — TELEPHONE ENCOUNTER
Reviewed recommendations with patient, verbalized understanding, will call with any further questions or complaints.  Pt states that he will start bystolic 5mg daily as recommended.    Hemalatha Angulo RN  Triage Nurse  06/30/25 12:28 EDT

## 2025-06-30 NOTE — TELEPHONE ENCOUNTER
"Pt called  back in to triage.  He states that over the weekend, BP ranging from 128-150's/70-80's with HR 70-80's.  Pt states that he has had many readings in the 150's range, which is very concerning to him, as he has never had high BP before.  He states that he is no longer having the \"fluttering\" feeling in his chest, but now states that he sometimes feels \"like when I try to take a deep breath, it doesn't come\".  This occurs both at rest and with activity randomly, and happens inside, as he has been staying out of the heat mostly as recommended.  Pt states \"something isn't right\".    Recommendations?    Thanks so much,  Hemalatha Angulo, RN  Triage RN  06/30/25 11:42 EDT    "

## 2025-07-24 ENCOUNTER — OFFICE VISIT (OUTPATIENT)
Age: 70
End: 2025-07-24
Payer: MEDICARE

## 2025-07-24 ENCOUNTER — HOSPITAL ENCOUNTER (OUTPATIENT)
Facility: HOSPITAL | Age: 70
Discharge: HOME | End: 2025-07-24
Admitting: SURGERY
Payer: MEDICARE

## 2025-07-24 VITALS
SYSTOLIC BLOOD PRESSURE: 148 MMHG | BODY MASS INDEX: 31.68 KG/M2 | DIASTOLIC BLOOD PRESSURE: 84 MMHG | WEIGHT: 221.3 LBS | HEIGHT: 70 IN

## 2025-07-24 DIAGNOSIS — F41.9 ANXIETY: ICD-10-CM

## 2025-07-24 DIAGNOSIS — E13.51 PERIPHERAL VASCULAR DISEASE DUE TO SECONDARY DIABETES: ICD-10-CM

## 2025-07-24 DIAGNOSIS — I70.1 ATHEROSCLEROSIS OF RENAL ARTERY: ICD-10-CM

## 2025-07-24 DIAGNOSIS — G62.9 NEUROPATHY: Primary | ICD-10-CM

## 2025-07-24 PROCEDURE — 93922 UPR/L XTREMITY ART 2 LEVELS: CPT

## 2025-07-24 NOTE — PROGRESS NOTES
Patient Name: Jono Montanez    MRN: [id number] Encounter Date: 07/24/2025      Consulting Service: Vascular Surgery    Referring Provider: No ref. provider found       CHIEF COMPLAINT:  Chief Complaint   Patient presents with    Peripheral vascular disease due to secondary diabetes       Subjective    HPI: Jono Montanez is a 69 y.o. male being seen for evaluation/management of peripheral vascular disease follow-up.  The patient has known peripheral vascular disease with symptoms of Parkinson arteries without occlusive disease without claudication.  Current symptoms of claudication at n o distance are noted without lifestyle limitation.  Prior interventions include none.  Currently the patient reports symptoms are stable.  Testing today includes ABIs.  Their current medical regimen includes antiplatelet and cholesterol medications.  The patient appears to have normal sensation.  Discussion as to the protection of feet and toes with prevention of injury including sensible shoes were reviewed with the patient.  Plan moving forward will include continue to follow-up with noninvasive testing.                                                                                                                                                                                                                                                     Patient is also being seen for evaluation/management of renal artery stenosis possibly driving his hypertension which is new onset..  Patient has not had prior intervention.  Patient's hypertension is moderately controlled on 1 antihypertensive medications.  Plans for future testing includes renal artery duplex.    PAST MEDICAL HISTORY:   Past Medical History:   Diagnosis Date    Diabetes mellitus     Disease of thyroid gland       PAST SURGICAL HISTORY:   Past Surgical History:   Procedure Laterality Date    CARDIAC CATHETERIZATION N/A 5/8/2025    Procedure: Coronary angiography, Left  "heart catheterization, Left ventricular angiography;  Surgeon: Ella Herndon MD;  Location: [facility] CATH INVASIVE LOCATION;  Service: Cardiology;  Laterality: N/A;    CARDIAC CATHETERIZATION N/A 5/8/2025    Procedure: Percutaneous Coronary Intervention;  Surgeon: Ella Herndon MD;  Location:  [facility] CATH INVASIVE LOCATION;  Service: Cardiology;  Laterality: N/A;    CARDIAC CATHETERIZATION N/A 5/8/2025    Procedure: Optical Coherence Tomography;  Surgeon: Ella Herndon MD;  Location: [facility] CATH INVASIVE LOCATION;  Service: Cardiology;  Laterality: N/A;      FAMILY HISTORY:   Family History   Problem Relation Age of Onset    No Known Problems Mother     Cerebral aneurysm Father 79    Cerebral aneurysm Brother 26      SOCIAL HISTORY:   Social History     Tobacco Use    Smoking status: Never     Passive exposure: Never    Smokeless tobacco: Never   Vaping Use    Vaping status: Never Used   Substance Use Topics    Alcohol use: Not Currently    Drug use: Defer      MEDICATIONS:   Current Outpatient Medications on File Prior to Visit   Medication Sig Dispense Refill    aspirin 81 MG EC tablet Take 1 tablet by mouth Daily.      clopidogrel (PLAVIX) 75 MG tablet Take 1 tablet by mouth Daily. 90 tablet 3    ezetimibe (ZETIA) 10 MG tablet Take 1 tablet by mouth Daily.      Jardiance 25 MG tablet tablet Take 1 tablet by mouth Every Morning.      levothyroxine (SYNTHROID, LEVOTHROID) 50 MCG tablet Take 1 tablet by mouth Daily.      nateglinide (STARLIX) 120 MG tablet Take 1 tablet by mouth 3 (Three) Times a Day Before Meals.      nebivolol (Bystolic) 5 MG tablet Take 1 tablet by mouth Daily. 30 tablet 11    rosuvastatin (CRESTOR) 40 MG tablet Take 1 tablet by mouth Daily.       No current facility-administered medications on file prior to visit.       ALLERGIES: Patient has no known allergies.       Objective   Vitals:    07/24/25 1227   BP: 148/84   Weight: 100 kg (221 lb 4.8 oz)   Height: 177.8 cm (70\")     Body mass index " is 31.75 kg/m².  BMI is >= 30 and <35. (Class 1 Obesity). The following options were offered after discussion;: weight loss educational material (shared in after visit summary), exercise counseling/recommendations, and Information on healthy weight added to patient's after visit summary.      PHYSICAL EXAM:   Physical Exam     Result Review   LABS:    CBC          5/5/2025    12:50   CBC   WBC 5.11    RBC 4.81    Hemoglobin 14.5    Hematocrit 43.0    MCV 89.4    MCH 30.1    MCHC 33.7    RDW 13.0    Platelets 239      BMP          5/5/2025    12:50   BMP   BUN 10    Creatinine 0.97    Sodium 141    Potassium 4.1    Chloride 102    CO2 26.0    Calcium 9.4                 Results Review:       I reviewed the patient's new clinical results.    The following radiologic or non-invasive studies have been reviewed by me: ABIs reviewed with noncompressible vessels on the right but excellent waveforms.  Left side looks normal.  No results found for this or any previous visit from the past 365 days.     No radiology results for the last 30 days.                ASSESSMENT/PLAN:   Diagnoses and all orders for this visit:    1. Neuropathy (Primary)  -     Duplex Renal Artery - Bilateral Complete CAR; Future  -     Doppler Ankle Brachial Index Single Level CAR; Future    2. Peripheral vascular disease due to secondary diabetes  -     Duplex Renal Artery - Bilateral Complete CAR; Future  -     Doppler Ankle Brachial Index Single Level CAR; Future    3. Atherosclerosis of renal artery  -     Duplex Renal Artery - Bilateral Complete CAR; Future    4. Anxiety       69 y.o. male with stable peripheral vascular disease of the lower extremities with hardening arteries but without occluded arteries.  He has had a recent coronary stent placed by Dr. Mcelroy after he became symptomatic and this is consistent with his known atherosclerotic vascular disease that has now become occlusive at the heart level.  We went over again the difference  between hardening of the arteries and the occlusions were stenosis of the arteries.  They go hand-in-hand and certainly can change.  We will continue to watch his feet with ABIs.  He is also now having onset of hypertension which most likely 90% plus would be related to idiopathic hypertension but there is a chance with his hardening of the arteries that renal artery stenosis and renovascular hypertension could play a role I will going to check that with a renal duplex now as well as check his ABIs back in a year.    Additionally we discussed the possibility that he is having some anxiety especially with his recent health issues and the loss of a friend as he is waking up at 2 or 3 in the morning and cannot go back to sleep and is wide-awake and the sounds like a fighter flight type of reflex issue.  I have recommended he discuss this again with Arnav Ocampo is excellent primary care doctor.    I discussed the plan with the patient who is agreeable to the plan of care at this point. Thank you for this consult.   Follow Up  Return in about 15 months (around 10/24/2026).    Evgeny Blanton MD   07/24/25

## 2025-07-28 LAB
BH CV LOWER ARTERIAL LEFT ABI RATIO: 0.95
BH CV LOWER ARTERIAL LEFT DORSALIS PEDIS SYS MAX: 146
BH CV LOWER ARTERIAL LEFT POST TIBIAL SYS MAX: NORMAL
BH CV LOWER ARTERIAL RIGHT ABI RATIO: 1.4
BH CV LOWER ARTERIAL RIGHT DORSALIS PEDIS SYS MAX: 176
BH CV LOWER ARTERIAL RIGHT POST TIBIAL SYS MAX: 216
UPPER ARTERIAL LEFT ARM BRACHIAL SYS MAX: NORMAL
UPPER ARTERIAL RIGHT ARM BRACHIAL SYS MAX: NORMAL

## 2025-08-04 ENCOUNTER — HOSPITAL ENCOUNTER (OUTPATIENT)
Facility: HOSPITAL | Age: 70
Discharge: HOME | End: 2025-08-04
Admitting: SURGERY
Payer: MEDICARE

## 2025-08-04 DIAGNOSIS — G62.9 NEUROPATHY: ICD-10-CM

## 2025-08-04 DIAGNOSIS — I70.1 ATHEROSCLEROSIS OF RENAL ARTERY: ICD-10-CM

## 2025-08-04 DIAGNOSIS — E13.51 PERIPHERAL VASCULAR DISEASE DUE TO SECONDARY DIABETES: ICD-10-CM

## 2025-08-04 PROCEDURE — 93975 VASCULAR STUDY: CPT

## 2025-08-06 LAB
BH CV ECHO MEAS - DIST REN A EDV LEFT: 33.9 CM/S
BH CV ECHO MEAS - DIST REN A PSV LEFT: 103.8 CM/S
BH CV ECHO MEAS - MID REN A EDV LEFT: 38.9 CM/S
BH CV ECHO MEAS - MID REN A PSV LEFT: 114.8 CM/S
BH CV ECHO MEAS - PROX REN A EDV LEFT: 24.3 CM/S
BH CV ECHO MEAS - PROX REN A PSV LEFT: 75.4 CM/S
BH CV VAS BP LEFT ARM: NORMAL MMHG
BH CV VAS BP RIGHT ARM: NORMAL MMHG
BH CV VAS RENAL AORTIC MID PSV: 82 CM/S
BH CV VAS RENAL HILUM LEFT EDV: 9.3 CM/S
BH CV VAS RENAL HILUM LEFT PSV: 29.7 CM/S
BH CV VAS RENAL HILUM RIGHT EDV: 11.6 CM/S
BH CV VAS RENAL HILUM RIGHT PSV: 40 CM/S
BH CV XLRA MEAS - KID L LEFT: 12 CM
BH CV XLRA MEAS DIST REN A EDV RIGHT: 20.5 CM/S
BH CV XLRA MEAS DIST REN A PSV RIGHT: 74 CM/S
BH CV XLRA MEAS KID L RIGHT: 12.1 CM
BH CV XLRA MEAS KID W RIGHT: 5.8 CM
BH CV XLRA MEAS MID REN A EDV RIGHT: 31.8 CM/S
BH CV XLRA MEAS MID REN A PSV RIGHT: 116.4 CM/S
BH CV XLRA MEAS PROX REN A EDV RIGHT: 39.5 CM/S
BH CV XLRA MEAS PROX REN A PSV RIGHT: 106.5 CM/S
BH CV XLRA MEAS RAR LEFT: 1.39
BH CV XLRA MEAS RAR RIGHT: 1.42
BH CV XLRA MEAS RENAL A ORG EDV LEFT: 24 CM/S
BH CV XLRA MEAS RENAL A ORG EDV RIGHT: 31 CM/S
BH CV XLRA MEAS RENAL A ORG PSV LEFT: 74 CM/S
BH CV XLRA MEAS RENAL A ORG PSV RIGHT: 93 CM/S
LEFT KIDNEY WIDTH: 5.7 CM
LEFT RENAL UPPER PARENCHYMA MAX: 22 CM/S
LEFT RENAL UPPER PARENCHYMA MIN: 9 CM/S
LEFT RENAL UPPER PARENCHYMA RI: 0.59
RIGHT RENAL UPPER PARENCHYMA MAX: 25 CM/S
RIGHT RENAL UPPER PARENCHYMA MIN: 9 CM/S
RIGHT RENAL UPPER PARENCHYMA RI: 0.64

## 2025-08-08 ENCOUNTER — TELEPHONE (OUTPATIENT)
Age: 70
End: 2025-08-08
Payer: MEDICARE

## 2025-08-14 ENCOUNTER — OFFICE VISIT (OUTPATIENT)
Age: 70
End: 2025-08-14
Payer: MEDICARE

## 2025-08-14 VITALS
DIASTOLIC BLOOD PRESSURE: 80 MMHG | SYSTOLIC BLOOD PRESSURE: 132 MMHG | WEIGHT: 221.4 LBS | BODY MASS INDEX: 31.7 KG/M2 | RESPIRATION RATE: 16 BRPM | HEIGHT: 70 IN

## 2025-08-14 DIAGNOSIS — I70.1 ATHEROSCLEROSIS OF RENAL ARTERY: Primary | ICD-10-CM

## 2025-08-14 DIAGNOSIS — G62.9 NEUROPATHY: ICD-10-CM

## 2025-08-14 DIAGNOSIS — F41.9 ANXIETY: ICD-10-CM

## 2025-08-14 DIAGNOSIS — E13.51 PERIPHERAL VASCULAR DISEASE DUE TO SECONDARY DIABETES: ICD-10-CM

## 2025-08-20 ENCOUNTER — TELEPHONE (OUTPATIENT)
Age: 70
End: 2025-08-20
Payer: MEDICARE

## 2025-08-21 RX ORDER — NEBIVOLOL 20 MG/1
20 TABLET ORAL DAILY
Qty: 90 TABLET | Refills: 3 | Status: SHIPPED | OUTPATIENT
Start: 2025-08-21

## (undated) DEVICE — TREK CORONARY DILATATION CATHETER 3.0 MM X 15 MM / RAPID-EXCHANGE: Brand: TREK

## (undated) DEVICE — CATH DIAG IMPULSE FR4 5F 100CM

## (undated) DEVICE — PK CATH CARD 40

## (undated) DEVICE — NC TREK NEO™ CORONARY DILATATION CATHETER 3.75 MM X 20 MM / RAPID-EXCHANGE: Brand: NC TREK NEO™

## (undated) DEVICE — DGW .035 FC J3MM 260CM TEF: Brand: EMERALD

## (undated) DEVICE — KT CATH IMG DRAGONFLY/OPSTAR 2.7F 135CM

## (undated) DEVICE — DEV INDEFLATOR P/N 580289

## (undated) DEVICE — RUNTHROUGH NS EXTRA FLOPPY PTCA GUIDEWIRE: Brand: RUNTHROUGH

## (undated) DEVICE — KT MANIFLD CARDIAC

## (undated) DEVICE — CATH DIAG IMPULSE FL3.5 5F 100CM

## (undated) DEVICE — RADIFOCUS OPTITORQUE ANGIOGRAPHIC CATHETER: Brand: OPTITORQUE

## (undated) DEVICE — GLIDESHEATH SLENDER STAINLESS STEEL KIT: Brand: GLIDESHEATH SLENDER

## (undated) DEVICE — 6F .070 XB 3.5 ECO PK: Brand: VISTA BRITE TIP